# Patient Record
Sex: FEMALE | Race: WHITE | NOT HISPANIC OR LATINO | URBAN - METROPOLITAN AREA
[De-identification: names, ages, dates, MRNs, and addresses within clinical notes are randomized per-mention and may not be internally consistent; named-entity substitution may affect disease eponyms.]

---

## 2021-04-06 ENCOUNTER — TRANSFERRED RECORDS (OUTPATIENT)
Dept: HEALTH INFORMATION MANAGEMENT | Facility: CLINIC | Age: 56
End: 2021-04-06

## 2021-04-07 ENCOUNTER — TRANSFERRED RECORDS (OUTPATIENT)
Dept: HEALTH INFORMATION MANAGEMENT | Facility: CLINIC | Age: 56
End: 2021-04-07

## 2021-04-08 ENCOUNTER — TRANSFERRED RECORDS (OUTPATIENT)
Dept: HEALTH INFORMATION MANAGEMENT | Facility: CLINIC | Age: 56
End: 2021-04-08

## 2021-04-11 ENCOUNTER — TRANSFERRED RECORDS (OUTPATIENT)
Dept: HEALTH INFORMATION MANAGEMENT | Facility: CLINIC | Age: 56
End: 2021-04-11

## 2021-04-15 ENCOUNTER — TRANSCRIBE ORDERS (OUTPATIENT)
Dept: OTHER | Age: 56
End: 2021-04-15

## 2021-04-15 DIAGNOSIS — R22.31 MASS OF FOREARM, RIGHT: Primary | ICD-10-CM

## 2021-04-16 ENCOUNTER — TELEPHONE (OUTPATIENT)
Dept: ORTHOPEDICS | Facility: CLINIC | Age: 56
End: 2021-04-16

## 2021-04-16 NOTE — TELEPHONE ENCOUNTER
LVM offernig to schedule appt. Provided call back number. Pt can be seen in person or virtually.     Karlee Bansal ATC

## 2021-04-16 NOTE — TELEPHONE ENCOUNTER
Reason for call:  Other   Patient called regarding (reason for call): appointment  Additional comments: patient has ortho oncology order for right forearm mass. Please contact patient to triage and schedule w/in appropriate timeframe.    Phone number to reach patient:  Cell number on file:    Telephone Information:   Mobile [phone]       Best Time:  Any     Can we leave a detailed message on this number?  Not Applicable    Travel screening: Not Applicable

## 2021-04-20 NOTE — TELEPHONE ENCOUNTER
DIAGNOSIS: Mass of right forearm/ext XR MRI/bcbs/ortho con   APPOINTMENT DATE: 4/27/2021   NOTES STATUS DETAILS   OFFICE NOTE from referring provider Linden De Paz CNP   OFFICE NOTE from other specialist N/A [facility] Records are in [facility]   DISCHARGE SUMMARY from hospital N/A    DISCHARGE REPORT from the ER N/A    OPERATIVE REPORT N/A    MEDICATION LIST Internal [facility] Records are in [facility]   EMG (for Spine) N/A    IMPLANT RECORD/STICKER N/A    LABS     CBC/DIFF N/A    CULTURES N/A    INJECTIONS DONE IN RADIOLOGY N/A    MRI Internal MRI Right Forearm [facility]    CT SCAN N/A    XRAYS (IMAGES & REPORTS) Internal Xray Forearm [facility]    TUMOR     PATHOLOGY  Slides & report N/A

## 2021-04-27 ENCOUNTER — PRE VISIT (OUTPATIENT)
Dept: ORTHOPEDICS | Facility: CLINIC | Age: 56
End: 2021-04-27

## 2021-04-27 ENCOUNTER — VIRTUAL VISIT (OUTPATIENT)
Dept: ORTHOPEDICS | Facility: CLINIC | Age: 56
End: 2021-04-27
Payer: COMMERCIAL

## 2021-04-27 DIAGNOSIS — R22.31 MASS OF FOREARM, RIGHT: ICD-10-CM

## 2021-04-27 DIAGNOSIS — D18.01 HEMANGIOMA OF SUBCUTANEOUS TISSUE: Primary | ICD-10-CM

## 2021-04-27 PROCEDURE — 99202 OFFICE O/P NEW SF 15 MIN: CPT | Mod: 95 | Performed by: ORTHOPAEDIC SURGERY

## 2021-04-27 NOTE — PROGRESS NOTES
I had a video interview with Rosa M. She describes noting a mass since December 2020. There has been no change in the size since then.    The patient describes the mass is hard.  I did visualize the mass through the video.  It does not appear to be a hard firm mass but this could only be assessed on physical exam.    Her past medical history and medication use is noncontributory and defines her as being extremely healthy.    MRI and x-rays were reviewed.  The x-ray shows no soft tissue calcifications.  The MRI scan shows a subcutaneous lesion which is diffusely involving the distal forearm.  Based on my impression I suspect the lesion is a benign vascular tumor.    I spoke with Rosa M about the options of coming to see me at the [facility] for an examination of the forearm and possibly a Frank-Cut biopsy at that time or alternatively beginning with a biopsy and based on those findings considering observation or an in person visit for biopsy.  She would prefer the latter approach.    Impression: Forearm lesion most likely represents a benign tumor.  Additional work-up is needed.    Plan: 1.  Inga to contact the patient to arrange for an ultrasound in [place].  The goal of the ultrasound will be to determine if the lesion is a hemangioma.  If this is diagnostic then she can be treated with observation.  2.  If ultrasound in the is not diagnostic I would need to see her in person to determine if Frank-Cut biopsy can be performed.

## 2021-04-27 NOTE — LETTER
4/27/2021       RE: Rosa M Louie  [address] [facility] [address]    Dear Colleague,    Thank you for referring your patient, Rosa M Louie, to the [facility] [place]. Please see a copy of my visit note below.    I had a video interview with Rosa M. She describes noting a mass since December 2020. There has been no change in the size since then.    The patient describes the mass is hard.  I did visualize the mass through the video.  It does not appear to be a hard firm mass but this could only be assessed on physical exam.    Her past medical history and medication use is noncontributory and defines her as being extremely healthy.    MRI and x-rays were reviewed.  The x-ray shows no soft tissue calcifications.  The MRI scan shows a subcutaneous lesion which is diffusely involving the distal forearm.  Based on my impression I suspect the lesion is a benign vascular tumor.    I spoke with Rosa M about the options of coming to see me at the [facility] for an examination of the forearm and possibly a Frank-Cut biopsy at that time or alternatively beginning with a biopsy and based on those findings considering observation or an in person visit for biopsy.  She would prefer the latter approach.    Impression: Forearm lesion most likely represents a benign tumor.  Additional work-up is needed.    Plan: 1.  Inga to contact the patient to arrange for an ultrasound in [place].  The goal of the ultrasound will be to determine if the lesion is a hemangioma.  If this is diagnostic then she can be treated with observation.  2.  If ultrasound in the is not diagnostic I would need to see her in person to determine if Frank-Cut biopsy can be performed.      Irving Elizabeth MD

## 2021-04-28 ENCOUNTER — TELEPHONE (OUTPATIENT)
Dept: ORTHOPEDICS | Facility: CLINIC | Age: 56
End: 2021-04-28

## 2021-04-28 DIAGNOSIS — R22.31 MASS OF FOREARM, RIGHT: Primary | ICD-10-CM

## 2021-04-28 DIAGNOSIS — D18.01 HEMANGIOMA OF SUBCUTANEOUS TISSUE: ICD-10-CM

## 2021-04-28 NOTE — TELEPHONE ENCOUNTER
RN called and spoke with Rosa M.  She would like to have the U/S done at the [facility], she states that she has had ultrasounds there before.  RN will fax order over .    [facility]  [address], [place] [address]  Hours:   Open ? Closes 8PM  Phone: [phone]

## 2021-05-02 ENCOUNTER — HEALTH MAINTENANCE LETTER (OUTPATIENT)
Age: 56
End: 2021-05-02

## 2021-05-10 ENCOUNTER — TELEPHONE (OUTPATIENT)
Dept: ORTHOPEDICS | Facility: CLINIC | Age: 56
End: 2021-05-10

## 2021-05-10 NOTE — TELEPHONE ENCOUNTER
[facility] is calling me back.  They did not get the orders that we faxed to them.  RN confirmed fax number: Fax:[phone].  RN will have staff refax order for U/S

## 2021-05-10 NOTE — TELEPHONE ENCOUNTER
RN called to [facility] [phone]  Left a voice message to call me back if patient is scheduled for her U/S or not yet.

## 2021-05-17 ENCOUNTER — TELEPHONE (OUTPATIENT)
Dept: ORTHOPEDICS | Facility: CLINIC | Age: 56
End: 2021-05-17

## 2021-05-17 NOTE — TELEPHONE ENCOUNTER
RN called to [facility], [phone].  Patient is now scheduled for the U/S of the right forearm on  05-24-21

## 2021-05-24 ENCOUNTER — TRANSFERRED RECORDS (OUTPATIENT)
Dept: HEALTH INFORMATION MANAGEMENT | Facility: CLINIC | Age: 56
End: 2021-05-24

## 2021-05-27 ENCOUNTER — TELEPHONE (OUTPATIENT)
Dept: ORTHOPEDICS | Facility: CLINIC | Age: 56
End: 2021-05-27

## 2021-05-27 DIAGNOSIS — R22.31 MASS OF FOREARM, RIGHT: Primary | ICD-10-CM

## 2021-05-27 NOTE — TELEPHONE ENCOUNTER
I left a phone message for Rosa M.  The ultrasound results are not interpreted to be conclusive of a vascular tumor.  They in fact on even address that.  I reviewed her MRI scan.  It still my impression is this represents a benign vascular tumor.    In the phone message I recommended that our team contact the patient to schedule a face-to-face visit and to schedule an ultrasound done at the [facility] prior to that visit.  During the visit hopefully ultrasound findings would finalize it is a benign vascular tumor and I would have a face-to-face conversation with the patient is whether she would like the tumor biopsied or removed.  It is concluded that it is a benign vascular tumor we would not propose a biopsy but would discuss whether she would like it removed or to be left alone and followed.

## 2021-06-24 ENCOUNTER — OFFICE VISIT (OUTPATIENT)
Dept: ORTHOPEDICS | Facility: CLINIC | Age: 56
End: 2021-06-24
Payer: COMMERCIAL

## 2021-06-24 ENCOUNTER — ANCILLARY PROCEDURE (OUTPATIENT)
Dept: ULTRASOUND IMAGING | Facility: CLINIC | Age: 56
End: 2021-06-24
Attending: PHYSICIAN ASSISTANT
Payer: COMMERCIAL

## 2021-06-24 DIAGNOSIS — D18.01 HEMANGIOMA OF SUBCUTANEOUS TISSUE: Primary | ICD-10-CM

## 2021-06-24 DIAGNOSIS — R22.31 MASS OF FOREARM, RIGHT: ICD-10-CM

## 2021-06-24 PROCEDURE — 99213 OFFICE O/P EST LOW 20 MIN: CPT | Performed by: PHYSICIAN ASSISTANT

## 2021-06-24 PROCEDURE — 76882 US LMTD JT/FCL EVL NVASC XTR: CPT | Mod: RT | Performed by: RADIOLOGY

## 2021-06-24 NOTE — PROGRESS NOTES
Chief Complaint: Right forearm mass    HPI: Rosa M is a 56 year old female here for follow-up of her right forearm mass. She originally met with Dr. Clara gil.  She had a repeat ultrasound of the right forearm today.  She is here to discuss results and further plan of care.  She reports that for the most part the mass does not bother her unless she bumps it or someone pushes on it.  She does notice it occasionally during the day.  It does not wake her up at night.  She is right-handed.  She works as a  but it does not stop her from doing her work.  She feels that if it is benign, she likely would not do anything about it. No other concerns.    Physical Exam: Rosa M is a pleasant 56-year-old female who is alert and oriented no apparent distress.  She has a palpable soft ulnar-sided forearm mass with scattered areas of nodularity.  It is mobile.  It is tender to palpation.  No overlying skin changes.  She has full wrist and hand range of motion.    Imaging: Approximately 2.1 x 2.8 x 5.0 cm heterogeneously  hypoechoic subcutaneous mass with internal vascular flow containing both venous and arterial components without definite evidence of communication of the 2 vessels.    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  We would not recommend biopsy or excision of the mass at this time.  It sounds like it is intermittently bothersome for her and she is not interested in having it removed either.  There are no restrictions with her activity.  She should let us know if it grows or becomes consistently more painful.  Otherwise we will see her back in 1 year with a repeat ultrasound and same-day appointment to go over those results to assess for any signs of growth or change.  She understands and agrees with plan of care.  All questions  answered.  Patient was also examined by Dr. Elizabeth, and he agrees with the plan of care.

## 2021-06-24 NOTE — NURSING NOTE
Chief Complaint   Patient presents with     RECHECK     followup right forearm        56 year old  1965    There were no vitals taken for this visit.      Date/Surgery/Surgeon/Hospital:  NA                            Pain Assessment  Patient Currently in Pain: [facility] - [place], [place] - [address]        No Known Allergies        No current outpatient medications on file.     No current facility-administered medications for this visit.              Questionnaires:    HOOS Hip Dysfunction & Osteoarthritis Outcome Questionnaire    No flowsheet data found.           KOOS Knee Survey Assessment    No flowsheet data found.           Promis 10 Assessment    No flowsheet data found.           Ortho Oxford Knee Questionnaire    No flowsheet data found.

## 2021-06-24 NOTE — LETTER
6/24/2021         RE: Rosa M Louie  [address]  [facility] [address]        Dear Colleague,    Thank you for referring your patient, Rosa M Louie, to the [facility] [place]. Please see a copy of my visit note below.    Chief Complaint: Right forearm mass    HPI: Rosa M is a 56 year old female here for follow-up of her right forearm mass. She originally met with Dr. Clara gil.  She had a repeat ultrasound of the right forearm today.  She is here to discuss results and further plan of care.  She reports that for the most part the mass does not bother her unless she bumps it or someone pushes on it.  She does notice it occasionally during the day.  It does not wake her up at night.  She is right-handed.  She works as a  but it does not stop her from doing her work.  She feels that if it is benign, she likely would not do anything about it. No other concerns.    Physical Exam: Rosa M is a pleasant 56-year-old female who is alert and oriented no apparent distress.  She has a palpable soft ulnar-sided forearm mass with scattered areas of nodularity.  It is mobile.  It is tender to palpation.  No overlying skin changes.  She has full wrist and hand range of motion.    Imaging: Approximately 2.1 x 2.8 x 5.0 cm heterogeneously  hypoechoic subcutaneous mass with internal vascular flow containing both venous and arterial components without definite evidence of communication of the 2 vessels.    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  We would not recommend biopsy or excision of the mass at this time.  It sounds like it is intermittently bothersome for her and she is not interested in having it removed either.  There are no restrictions with her activity.  She should let us know if it grows or becomes  consistently more painful.  Otherwise we will see her back in 1 year with a repeat ultrasound and same-day appointment to go over those results to assess for any signs of growth or change.  She understands and agrees with plan of care.  All questions answered.  Patient was also examined by Dr. Elizabeth, and he agrees with the plan of care.          Again, thank you for allowing me to participate in the care of your patient.        Sincerely,        Irving Elizabeth MD

## 2021-10-17 ENCOUNTER — HEALTH MAINTENANCE LETTER (OUTPATIENT)
Age: 56
End: 2021-10-17

## 2022-03-08 DIAGNOSIS — R22.31 MASS OF FOREARM, RIGHT: Primary | ICD-10-CM

## 2022-03-10 ENCOUNTER — TELEPHONE (OUTPATIENT)
Dept: ORTHOPEDICS | Facility: CLINIC | Age: 57
End: 2022-03-10
Payer: COMMERCIAL

## 2022-03-10 NOTE — TELEPHONE ENCOUNTER
----- Message from Inga Ulrich RN sent at 3/8/2022  2:00 PM CST -----  Please coordinate same day U/S and Dr. Elizabeth appt.   Due around end of June  Thanks   Inga

## 2022-05-29 ENCOUNTER — HEALTH MAINTENANCE LETTER (OUTPATIENT)
Age: 57
End: 2022-05-29

## 2022-10-03 ENCOUNTER — HEALTH MAINTENANCE LETTER (OUTPATIENT)
Age: 57
End: 2022-10-03

## 2023-06-04 ENCOUNTER — HEALTH MAINTENANCE LETTER (OUTPATIENT)
Age: 58
End: 2023-06-04

## 2023-08-12 ENCOUNTER — HEALTH MAINTENANCE LETTER (OUTPATIENT)
Age: 58
End: 2023-08-12